# Patient Record
Sex: MALE | Race: BLACK OR AFRICAN AMERICAN | NOT HISPANIC OR LATINO | Employment: FULL TIME | URBAN - METROPOLITAN AREA
[De-identification: names, ages, dates, MRNs, and addresses within clinical notes are randomized per-mention and may not be internally consistent; named-entity substitution may affect disease eponyms.]

---

## 2017-05-11 ENCOUNTER — COMMUNICATION - HEALTHEAST (OUTPATIENT)
Dept: SCHEDULING | Facility: CLINIC | Age: 47
End: 2017-05-11

## 2019-07-17 ENCOUNTER — OFFICE VISIT (OUTPATIENT)
Dept: FAMILY MEDICINE | Facility: CLINIC | Age: 49
End: 2019-07-17
Payer: OTHER MISCELLANEOUS

## 2019-07-17 VITALS
TEMPERATURE: 98.2 F | WEIGHT: 209 LBS | RESPIRATION RATE: 16 BRPM | HEART RATE: 76 BPM | DIASTOLIC BLOOD PRESSURE: 79 MMHG | SYSTOLIC BLOOD PRESSURE: 123 MMHG | OXYGEN SATURATION: 98 %

## 2019-07-17 DIAGNOSIS — S71.112A LACERATION OF LEFT THIGH, INITIAL ENCOUNTER: Primary | ICD-10-CM

## 2019-07-17 DIAGNOSIS — Z02.6 ENCOUNTER RELATED TO WORKER'S COMPENSATION CLAIM: ICD-10-CM

## 2019-07-17 PROCEDURE — 99207 ZZC NO CHARGE LOS: CPT | Performed by: NURSE PRACTITIONER

## 2019-07-17 PROCEDURE — 12002 RPR S/N/AX/GEN/TRNK2.6-7.5CM: CPT | Performed by: NURSE PRACTITIONER

## 2019-07-17 PROCEDURE — 90471 IMMUNIZATION ADMIN: CPT | Performed by: NURSE PRACTITIONER

## 2019-07-17 PROCEDURE — 90714 TD VACC NO PRESV 7 YRS+ IM: CPT | Performed by: NURSE PRACTITIONER

## 2019-07-17 ASSESSMENT — PAIN SCALES - GENERAL: PAINLEVEL: MODERATE PAIN (5)

## 2019-07-17 NOTE — PROGRESS NOTES
Subjective     Janak Alfonso is a 49 year old male who presents to clinic today for the following health issues:    HPI   Concern - Work Comp Laceration  Onset: Today    Description:   Laceration on left thigh    Intensity: severe    Progression of Symptoms:  same    Accompanying Signs & Symptoms:  Bleeding, bruising, and pain    Previous history of similar problem:   None    Precipitating factors:   Worsened by: None    Alleviating factors:  Improved by: None    Therapies Tried and outcome: None    Pleasant 49-year-old male presents with concerns for laceration to left thigh that occurred this morning while at work.  He was using a  and it slipped, broke and hit his left thigh.  He is able to walk and move his lower extremity as usual.  Denies numbness, tingling, or weakness.  The bleeding has stopped.  Last tetanus 2011.  This is a work comp injury.    Reviewed and updated as needed this visit by Provider  Tobacco  Allergies  Meds  Problems  Med Hx  Surg Hx  Fam Hx         Review of Systems   ROS COMP: Constitutional, HEENT, cardiovascular, pulmonary, gi and gu systems are negative, except as otherwise noted.      Objective    /79 (BP Location: Right arm, Patient Position: Chair, Cuff Size: Adult Large)   Pulse 76   Temp 98.2  F (36.8  C) (Oral)   Resp 16   Wt 94.8 kg (209 lb)   SpO2 98%   There is no height or weight on file to calculate BMI.  Physical Exam   GENERAL: healthy, alert and no distress  MS: no gross musculoskeletal defects noted, no edema. Cap refill <2 seconds and CMS intact distally. Normal gait. No tendon or deep tissue damage.  SKIN: macerated skin and 2 lacerations (2 cm and 2 cm) to left anterior thigh surrounded by ecchymosis. The wound edges are jagged. The lacerations are near the center of the macerated area.   PSYCH: mentation appears normal, affect normal/bright        Diagnostic Test Results:  none       Laceration repair:  Anesthesia with 2 ml lidocaine 1% with  epinephrine. Wound cleansed extensively - 300 ml sterile water for irrigation. 2 lacerations sutured with 4 sutures simple interrupted each (8 sutures total). Bacitracin applied to laceration and macerated area. Covered with nonstick dressing and paper tape. Patient tolerated well without immediate complications.      Assessment & Plan       ICD-10-CM    1. Laceration of left thigh, initial encounter S71.112A TD PRESERV FREE, IM (7+ YRS)   2. Encounter related to worker's compensation claim [email or web address]      Ice 15 minutes 4-6 times daily  Cleanse daily - shower is ok  Keep clean and dry  Change dressing daily (Bacitracin + Band Aid) x2-3 days and then can leave open to air. Cover when out of house  Monitor for signs of infection including redness, warmth, drainage, increased pain. If these develop, please get re-evaluated  Return in 8-10 days for suture removal  Work restrictions given       See Patient Instructions    Return in about 10 days (around 7/27/2019) for suture removal, evaluation.     The benefits, risks and potential side effects were discussed in detail. All patient questions were answered. The patient was instructed to follow up immediately if any adverse reactions develop.    Return precautions discussed, including when to seek urgent/emergent care.    Patient verbalizes understanding and agrees with plan of care. Patient stable for discharge.      EYAD Johnson [facility]

## 2019-07-17 NOTE — NURSING NOTE
Screening Questionnaire for Adult Immunization    Are you sick today?   No   Do you have allergies to medications, food, a vaccine component or latex?   No   Have you ever had a serious reaction after receiving a vaccination?   No   Do you have a long-term health problem with heart disease, lung disease, asthma, kidney disease, metabolic disease (e.g. diabetes), anemia, or other blood disorder?   No   Do you have cancer, leukemia, HIV/AIDS, or any other immune system problem?   No   In the past 3 months, have you taken medications that affect  your immune system, such as prednisone, other steroids, or anticancer drugs; drugs for the treatment of rheumatoid arthritis, Crohn s disease, or psoriasis; or have you had radiation treatments?   No   Have you had a seizure, or a brain or other nervous system problem?   No   During the past year, have you received a transfusion of blood or blood     products, or been given immune (gamma) globulin or antiviral drug?   No   For women: Are you pregnant or is there a chance you could become        pregnant during the next month?   No   Have you received any vaccinations in the past 4 weeks?   No     Immunization questionnaire answers were all negative.        Per orders of TERI Echeverria, injection of Td given by Deb Lee. Patient instructed to remain in clinic for 15 minutes afterwards, and to report any adverse reaction to me immediately.       Screening performed by Deb Lee on 7/17/2019 at 11:21 AM.

## 2019-07-17 NOTE — PATIENT INSTRUCTIONS
Ice 15 minutes 4-6 times daily  Cleanse daily - shower is ok  Keep clean and dry  Change dressing daily (Bacitracin + Band Aid) x2-3 days and then can leave open to air. Cover when out of house  Monitor for signs of infection including redness, warmth, drainage, increased pain. If these develop, please get re-evaluated  Return in 8-10 days for suture removal      Patient Education     Extremity Laceration: Stitches, Staples, or Tape  A laceration is a cut through the skin. If it is deep, it may require stitches or staples to close so it can heal. Minor cuts may be treated with surgical tape closures, or skin glue.  X-rays may be done if something may have entered the skin through the cut. You may also need a tetanus shot if you are not up to date on this vaccine.  Home care    Follow the healthcare provider s instructions on how to care for the cut.    Wash your hands with soap and warm water before and after caring for your wound. This is to help prevent infection.    Keep the wound clean and dry. If a bandage was applied and it becomes wet or dirty, replace it. Otherwise, leave it in place for the first 24 hours, then change it once a day or as directed.    If stitches or staples were used, clean the wound daily:  ? After removing the bandage, wash the area with soap and water. Use a wet cotton swab to loosen and remove any blood or crust that forms.  ? After cleaning, keep the wound clean and dry. Talk with your healthcare provider before putting any antibiotic ointment on the wound. Reapply the bandage.    You may remove the bandage to shower as usual after the first 24 hours, but don't soak the area in water (no swimming) until the stitches or staples are removed.    If surgical tape closures were used, keep the area clean and dry. If it becomes wet, blot it dry with a towel. Let the surgical tape fall off on its own.    The healthcare provider may prescribe an antibiotic cream or ointment to prevent infection.  He or she may also prescribe an antibiotic pill. Don't stop taking this medicine until you have finished it all or the provider tells you to stop.    The provider may also prescribe medicine for pain. Follow the instructions for taking these medicines.    Don't do activities that may reopen your wound.  Follow-up care  Follow up with your healthcare provider, or as advised. Most skin wounds heal within 10 days. But an infection may sometimes occur even with proper treatment. Check the wound daily for the signs of infection listed below. Stitches and staples should be removed within 7 to14 days. If surgical tape closures were used, you may remove them after 10 days if they have not fallen off by then.   When to seek medical advice  Call your healthcare provider right away if any of these occur:    Wound bleeding not controlled by direct pressure    Signs of infection, including increasing pain in the wound, increasing wound redness or swelling, or pus or bad odor coming from the wound    Fever of 100.4 F (38 C) or higher, or as directed by your healthcare provider    Stitches or staples come apart or fall out or surgical tape falls off before 7 days    Wound edges reopen    Wound changes colors    Numbness occurs around the wound     Decreased movement around the injured area  Date Last Reviewed: 7/1/2017 2000-2018 [employer]. [address] [place] [address], [place] [address]. All rights reserved. This information is not intended as a substitute for professional medical care. Always follow your healthcare professional's instructions.

## 2019-07-17 NOTE — LETTER
REPORT OF WORK COMP    [address] [address]  [phone]      PATIENT DATA    Employee Name: Janak Alfonso      : 1970     #: [id number]    Work related injury: Yes  Employer at time of injury:    Employer contact & phone:    Employed elsewhere? No  Workers' Compensation Carrier/Managed Care Plan:       Today's date: 2019  Date of injury: 2019  Date of first visit: 2019    PROVIDER EVALUATION: Please fill in as needed.  Please give copy to employee for employer.    1. Diagnosis: thigh laceration    2. Treatment: 8 sutures.  3. Medication: tylenol as needed  NOTE: When ordering a medication, [place] Rules require Work Comp or WC on prescriptions.    4. No work from 2019 to 2019.  5. Return to work date: 2019   ** WITH RESTRICTIONS? Yes, with work restrictions: * Keep injury site clean and dry, limit bending and putting pressure on thigh DURATION OF LIMITATIONS: until sutures out in 8-10 days      RESTRICTIONS: Unlimited unless listed.  Restrictions apply to home and leisure also.  If work restrictions is not available, the employee is totally disabled.    Maximum Medical Improvement (Date):   Any Permanent Partial Disability? Deferred to future exam/consult.    Provider comments: as above    Medical Examiner: EYAD Johnson CNP           License or registration: [id number]    Next appointment: 8-10 days    CC: Employer, Managed Care Plan/Payor, Patient

## 2019-07-26 ENCOUNTER — ALLIED HEALTH/NURSE VISIT (OUTPATIENT)
Dept: NURSING | Facility: CLINIC | Age: 49
End: 2019-07-26
Payer: OTHER MISCELLANEOUS

## 2019-07-26 DIAGNOSIS — Z48.02 VISIT FOR SUTURE REMOVAL: Primary | ICD-10-CM

## 2019-07-26 NOTE — PROGRESS NOTES
Janak Alfonso presents to the clinic today for removal of sutures.  The patient has had the sutures in place for 9 days.  There has been no history of infection or drainage.  8 sutures are seen located on the left thigh.  The wound is healing well with no signs of infection.  Tetanus status is up to date.   All sutures were easily removed today.  Routine wound care performed and home care discussed.  Reviewed s/s of infection. The patient will follow up via call or office visit as needed.     Patient had no further requests or needs at this time.     Ivelisse Lilly RN

## 2024-06-03 ENCOUNTER — OFFICE VISIT (OUTPATIENT)
Dept: FAMILY MEDICINE | Facility: CLINIC | Age: 54
End: 2024-06-03

## 2024-06-03 ENCOUNTER — HOSPITAL ENCOUNTER (OUTPATIENT)
Dept: GENERAL RADIOLOGY | Facility: HOSPITAL | Age: 54
Discharge: HOME | End: 2024-06-03
Attending: FAMILY MEDICINE | Admitting: FAMILY MEDICINE

## 2024-06-03 VITALS
DIASTOLIC BLOOD PRESSURE: 76 MMHG | OXYGEN SATURATION: 99 % | BODY MASS INDEX: 29.56 KG/M2 | RESPIRATION RATE: 14 BRPM | SYSTOLIC BLOOD PRESSURE: 128 MMHG | TEMPERATURE: 98.2 F | WEIGHT: 206 LBS | HEART RATE: 77 BPM

## 2024-06-03 DIAGNOSIS — T14.8XXA BRUISING: ICD-10-CM

## 2024-06-03 DIAGNOSIS — T14.8XXA ABRASION: ICD-10-CM

## 2024-06-03 DIAGNOSIS — R07.81 RIB PAIN ON RIGHT SIDE: Primary | ICD-10-CM

## 2024-06-03 PROCEDURE — 99203 OFFICE O/P NEW LOW 30 MIN: CPT | Performed by: FAMILY MEDICINE

## 2024-06-03 PROCEDURE — 71101 X-RAY EXAM UNILAT RIBS/CHEST: CPT | Mod: RT

## 2024-06-03 ASSESSMENT — PAIN SCALES - GENERAL: PAINLEVEL: NO PAIN (1)

## 2024-06-03 NOTE — PROGRESS NOTES
OUTPATIENT VISIT NOTE                                                   Date of Visit: 6/3/2024     Chief Complaint   Patient presents with:  Chest Pain: Right sided ribs pain started 2 years ago- unsure if jumped over the fence caused it activated again             History of Present Illness   Janak Alfonso is a 54 year old male c/o right sided rib pain present every day for the last two weeks.  All day long.  Mid lower right rib cage.  Jumped over fence in the past two days. Scraped abdomen, leg, back    Two years ago had injury to ribs--improved on its own, but has recurred every few months.       MEDICATIONS   Current Outpatient Medications   Medication Sig Dispense Refill    albuterol (PROVENTIL HFA;VENTOLIN HFA) 90 mcg/actuation inhaler [ALBUTEROL (PROVENTIL HFA;VENTOLIN HFA) 90 MCG/ACTUATION INHALER] Inhale 1-2 puffs every 6 (six) hours as needed for wheezing. 1 Inhaler 0     No current facility-administered medications for this visit.         SOCIAL HISTORY   Social History     Tobacco Use    Smoking status: Never    Smokeless tobacco: Never   Substance Use Topics    Alcohol use: Yes           Physical Exam   Vitals:    06/03/24 1018   BP: 128/76   Pulse: 77   Resp: 14   Temp: 98.2  F (36.8  C)   SpO2: 99%   Weight: 93.4 kg (206 lb)        GEN:  NAD  LUNGS:  Clear to auscultation without wheezing.  Normal effort.  HEART:  RRR without murmur, rub or gallop   CHEST WALL: moderate tenderness lower rib cage mid axillary line--right side  MS: Bruise left mid back, abdomen, large on both inner thighs  Scratches on abdomen and both inner thighs.     Diagnostic Studies   LABS:  Results for orders placed or performed in visit on 06/03/24   XR Ribs & Chest Rt 3vw     Status: None    Narrative    EXAM: XR RIBS and CHEST RT 3VW  LOCATION: [facility]  DATE: 6/3/2024    INDICATION:  Rib pain on right side  COMPARISON: 08/28/2020      Impression    IMPRESSION: The visualized heart and lungs are  negative. No right rib fractures.            Assessment and Plan     Rib pain on right side  Probably recurrent muscle strain.  Ice, ibuprofen, stretching.  Recheck for problems  - XR Ribs & Chest Rt 3vw    Abrasion  Bruising  None look infected.  Apply vaseline to abrasions.  Ice to bruising.    Follow up with primary care.                 Discussed signs / symptoms that warrant urgent / emergent medical attention.   Recheck if worsening or not improving.       Jonny Saleh MD          Pertinent History     The following portions of the patient's history were reviewed and updated as appropriate: allergies, current medications, past family history, past medical history, past social history, past surgical history and problem list.